# Patient Record
Sex: FEMALE | RURAL
[De-identification: names, ages, dates, MRNs, and addresses within clinical notes are randomized per-mention and may not be internally consistent; named-entity substitution may affect disease eponyms.]

---

## 2018-11-27 ENCOUNTER — TELEPHONE (OUTPATIENT)
Dept: FAMILY MEDICINE CLINIC | Facility: CLINIC | Age: 38
End: 2018-11-27

## 2018-11-27 DIAGNOSIS — M72.2 PLANTAR FASCIITIS, BILATERAL: Primary | ICD-10-CM

## 2018-12-11 NOTE — TELEPHONE ENCOUNTER
Patient was seen by Koko Pulido in [place], and referral made from their office on 10/23/18 per the note. We will be happy to schedule through [facility] if she'd like, but we were not the office that made the referral initially. See order today.

## 2019-02-20 ENCOUNTER — TELEPHONE (OUTPATIENT)
Dept: FAMILY MEDICINE CLINIC | Facility: CLINIC | Age: 39
End: 2019-02-20

## 2019-02-20 NOTE — TELEPHONE ENCOUNTER
PT called in stating she had received call regarding a referral for [facility]. PT stated she has changed her PCP and referral was no longer needed. Relayed information to Laura as well.

## 2024-07-31 ENCOUNTER — HOSPITAL ENCOUNTER (OUTPATIENT)
Dept: PHYSICAL THERAPY | Age: 44
Setting detail: THERAPIES SERIES
Discharge: HOME | End: 2024-07-31
Payer: OTHER GOVERNMENT

## 2024-07-31 PROCEDURE — 97162 PT EVAL MOD COMPLEX 30 MIN: CPT

## 2024-07-31 NOTE — PROGRESS NOTES
education and instruction: Goals, PT Role, Plan of Care, Evaluative findings, Disease Specific Education    Biofeedback        Treatment may include any combination of the following: Current Treatment Recommendations: Strengthening, ROM, Functional mobility training, Modalities, Manual, Patient/Caregiver education & training, Neuromuscular re-education, Home exercise program  Modalities: E-stim - manual     Frequency / Duration:  Patient to be seen   for   weeks      Eval Complexity:    Decision Making: Medium Complexity    PT Treatment Completed:  N/A - Evaluation Only    Therapy Time  Individual Time In: 1055       Individual Time Out: 1145  Minutes: 50  Timed Code Treatment Minutes: 50 Minutes       Therapist Signature: Mara Cole, PT    Date: 7/31/2024     I certify that the above Therapy Services are being furnished while the patient is under my care. I agree with the treatment plan and certify that this therapy is necessary.      Physician's Signature:  ___________________________   Date:_______                                                                   Ignacio Murillo PA        Physician Comments: _______________________________________________    Please sign and return to [facility].  Please fax to the location listed below. THANK YOU for this referral!    [facility]  [address] [place] [id number]  Dept: [phone]  Dept Fax: [phone]  Loc: [phone]       POC NOTE

## 2024-08-05 ENCOUNTER — HOSPITAL ENCOUNTER (OUTPATIENT)
Dept: PHYSICAL THERAPY | Age: 44
Setting detail: THERAPIES SERIES
Discharge: HOME | End: 2024-08-05
Payer: OTHER GOVERNMENT

## 2024-08-05 PROCEDURE — 97110 THERAPEUTIC EXERCISES: CPT

## 2024-08-05 PROCEDURE — 97140 MANUAL THERAPY 1/> REGIONS: CPT

## 2024-08-05 NOTE — PROGRESS NOTES
Physical Therapy: Daily Note   Patient: Jayleen Medina (43 y.o. female)   Examination Date: 2024  Plan of Care/Certification Expiration Date: 10/30/24    No data recorded   :  1980 # of Visits since SOC:   2   MRN: [id number]  CSN: [id number] Start of Care Date:   2024   Insurance: Payor: [employer] / Plan: [employer] / Product Type: *No Product type* /   Insurance ID: [id number] - (Other) Secondary Insurance (if applicable):    Referring Physician: Ignacio Murillo PA Andrea Melvin   PCP: Ignacio Murillo PA Visits to Date/Visits Approved: 2 / 15    No Show/Cancelled Appts:   /       Medical Diagnosis: Other specified urinary incontinence [N39.498] Unspecified urinary incontinence [id number]  Treatment Diagnosis: Mixed incontinence        SUBJECTIVE EXAMINATION   Pain Level: Pain Screening  Patient Currently in Pain: Denies    Patient Comments: Subjective: Nothing new since here last except for I have started my cycle.        TREATMENT     Exercises:      Treatment Reasoning    Exercise 1: Legs up wall timed x 3 mins.  Exercise 2: TA set -10 x 10 sec alone,  all others x 10  Exercise 3: Piriformis > 90  and < 90, hamstring stretch bilateral 4 x 15 sec  Exercise 4: Glute bridge with TA contraction- 1 x 10  Exercise 5: Happy baby timed x 1 min  Exercise 6: LTR-5 x 5 sec  Exercise 7: Butterfly stretch X 1 min  Exercise 8: Internal manual therapy to reduce R PFM tension including levator ani, coccygeus, OI. Ischemic pressure, sweeping. x 8 mins  Exercise 9: Biofeedback downtraining initially-NOT TODAY  Exercise 10: Biofeedback for PFM strengthening, supine hooklying alone, adduction, abduction. Move to more upright positions as able.-NOT TODAY  Exercise 11: Bladder diary issued                           Pt Education: Additional Comments: Biofeedback       ASSESSMENT     Assessment: Assessment: Initiated stretching and relaxation per primary therapists POC.  Patient did well in session.  She

## 2024-08-07 ENCOUNTER — HOSPITAL ENCOUNTER (OUTPATIENT)
Dept: PHYSICAL THERAPY | Age: 44
Setting detail: THERAPIES SERIES
Discharge: HOME | End: 2024-08-07
Payer: OTHER GOVERNMENT

## 2024-08-07 PROCEDURE — 97112 NEUROMUSCULAR REEDUCATION: CPT

## 2024-08-07 PROCEDURE — 97140 MANUAL THERAPY 1/> REGIONS: CPT

## 2024-08-07 PROCEDURE — 97110 THERAPEUTIC EXERCISES: CPT

## 2024-08-07 NOTE — PROGRESS NOTES
Physical Therapy: Daily Note   Patient: Jayleen Medina (43 y.o. female)   Examination Date: 2024  Plan of Care/Certification Expiration Date: 10/30/24    No data recorded   :  1980 # of Visits since SOC:   3   MRN: [id number]  CSN: [id number] Start of Care Date:   2024   Insurance: Payor: [employer] / Plan: [employer] / Product Type: *No Product type* /   Insurance ID: [id number] - (Other) Secondary Insurance (if applicable):    Referring Physician: Ignacio Murillo PA Andrea Melvin   PCP: Ignacio Murillo PA Visits to Date/Visits Approved: 3 / 15    No Show/Cancelled Appts:   /       Medical Diagnosis: Other specified urinary incontinence [N39.498] Unspecified urinary incontinence [id number]  Treatment Diagnosis: Mixed incontinence        SUBJECTIVE EXAMINATION   Pain Level: Pain Screening  Patient Currently in Pain: Denies    Patient Comments: Subjective: I was sore for a day after last appointment. I've been doing some of the stretches at home.    HEP Compliance: Good  Any changes to allergies, medications, or have you had any medical procedures/ER visits since your last visit?: No     OBJECTIVE EXAMINATION   Restrictions:  No data recorded No data recorded No data recorded        Improved muscle health of pelvic floor muscles with decreased tension and tenderness noted today.    TREATMENT     Exercises:      Treatment Reasoning    Exercise 1: Legs up wall timed x 3 mins.    HEP in chart  Exercise 2: TA set -10 x 10 sec alone,  all others x 10  Exercise 3: Piriformis > 90  and < 90, hamstring stretch bilateral 4 x 15 sec           increase to 30 seconds each next time  Exercise 4: Glute bridge with TA contraction- 1 x 10  Exercise 5: Happy baby timed x 1 min  Exercise 6: LTR- 10 x 5 sec  Exercise 7: Butterfly stretch X 1 min  Exercise 8: Internal manual therapy to reduce R PFM tension including levator ani, coccygeus, OI. Ischemic pressure, sweeping. x 12 mins  Exercise 9: Biofeedback

## 2024-08-12 ENCOUNTER — HOSPITAL ENCOUNTER (OUTPATIENT)
Dept: PHYSICAL THERAPY | Age: 44
Setting detail: THERAPIES SERIES
Discharge: HOME | End: 2024-08-12
Payer: OTHER GOVERNMENT

## 2024-08-12 PROCEDURE — 97110 THERAPEUTIC EXERCISES: CPT

## 2024-08-12 PROCEDURE — 97140 MANUAL THERAPY 1/> REGIONS: CPT

## 2024-08-12 ASSESSMENT — PAIN DESCRIPTION - ORIENTATION: ORIENTATION: LOWER

## 2024-08-12 ASSESSMENT — PAIN SCALES - GENERAL: PAINLEVEL_OUTOF10: 1

## 2024-08-12 ASSESSMENT — PAIN DESCRIPTION - DESCRIPTORS: DESCRIPTORS: SORE

## 2024-08-12 ASSESSMENT — PAIN DESCRIPTION - LOCATION: LOCATION: BACK

## 2024-08-12 NOTE — PROGRESS NOTES
Physical Therapy: Daily Note   Patient: Jayleen Medina (43 y.o. female)   Examination Date: 2024  Plan of Care/Certification Expiration Date: 10/30/24    No data recorded   :  1980 # of Visits since SOC:   4   MRN: [id number]  CSN: [id number] Start of Care Date:   2024   Insurance: Payor: [employer] / Plan: [employer] / Product Type: *No Product type* /   Insurance ID: [id number] - (Other) Secondary Insurance (if applicable):    Referring Physician: Ignacio Murillo PA Andrea Melvin   PCP: Ignacio Murillo PA Visits to Date/Visits Approved: 4 / 15    No Show/Cancelled Appts:   /       Medical Diagnosis: Other specified urinary incontinence [N39.498] Unspecified urinary incontinence [id number]  Treatment Diagnosis: Mixed incontinence        SUBJECTIVE EXAMINATION   Pain Level: Pain Screening  Patient Currently in Pain: Yes  Pain Assessment: 0-10  Pain Level: 1  Pain Location: Back  Pain Orientation: Lower  Pain Descriptors: Sore    Patient Comments: Subjective: My back is feeling better.      TREATMENT     Exercises:      Treatment Reasoning    Exercise 1: Legs up wall timed x 3 mins.    HEP issued 2024  Exercise 2: TA set -10 x 10 sec alone,  all others x 10  Exercise 3: Piriformis > 90  and < 90, hamstring stretch bilateral 4 x 15 sec           increase to 30 seconds each next time  Exercise 4: Glute bridge with TA contraction- 1 x 10  Exercise 5: Happy baby timed x 1 min  Exercise 6: LTR- 10 x 5 sec  Exercise 7: Butterfly stretch X 1 min  Exercise 8: Internal manual therapy to reduce R PFM tension including levator ani, coccygeus, OI. Ischemic pressure, sweeping. x 12 mins  Exercise 9: Biofeedback downtraining initially-NOT TODAY  Exercise 10: Biofeedback for PFM strengthening, supine hooklying alone, adduction, abduction. Move to more upright positions as able.-NOT TODAY  Exercise 11: Bladder diary issued                           Pt Education: Additional Comments: Biofeedback

## 2024-08-15 ENCOUNTER — APPOINTMENT (OUTPATIENT)
Dept: PHYSICAL THERAPY | Age: 44
End: 2024-08-15
Payer: OTHER GOVERNMENT

## 2024-08-19 ENCOUNTER — HOSPITAL ENCOUNTER (OUTPATIENT)
Dept: PHYSICAL THERAPY | Age: 44
Setting detail: THERAPIES SERIES
Discharge: HOME | End: 2024-08-19
Payer: OTHER GOVERNMENT

## 2024-08-19 PROCEDURE — 97124 MASSAGE THERAPY: CPT

## 2024-08-19 PROCEDURE — 97110 THERAPEUTIC EXERCISES: CPT

## 2024-08-19 ASSESSMENT — PAIN DESCRIPTION - DESCRIPTORS: DESCRIPTORS: SORE

## 2024-08-19 ASSESSMENT — PAIN DESCRIPTION - ORIENTATION: ORIENTATION: LOWER;RIGHT

## 2024-08-19 ASSESSMENT — PAIN SCALES - GENERAL: PAINLEVEL_OUTOF10: 2

## 2024-08-19 ASSESSMENT — PAIN DESCRIPTION - LOCATION: LOCATION: BACK;GROIN;BUTTOCKS

## 2024-08-19 NOTE — PROGRESS NOTES
TODAY  Exercise 10: Biofeedback for PFM strengthening, supine hooklying alone, adduction, abduction. Move to more upright positions as able.-NOT TODAY  Exercise 11: Bladder diary issued 7/31                          Pt Education: Additional Comments: Biofeedback       ASSESSMENT     Assessment: Assessment: Patient presents today with complaints of having had some back and groin issues on the right.  She relates it was very intense.  She reports feeling some better today but is concerned about the manual internal therapy.  With internal manual therapy, patient had increased sensitivity and pain in right OI, levator ani and coccyxgeus.She was able to complete all exercises with no increase in pain.  Body Structures, Functions, Activity Limitations Requiring Skilled Therapeutic Intervention: Decreased ADL status, Decreased body mechanics, Decreased strength, Decreased high-level IADLs, Increased pain    Post-Treatment Pain Level:      No data recorded  Therapy Prognosis: Good       GOALS   Patient Goals : To decrease leakage  Short Term Goals Completed by 4 weeks Current Status Goal Status   Patient will be independent with HEP   New   Patient will report no to min tenderness with palpation of R PFMs on internal exam to demo improved biomechanics and overall muscle health   New   Patient will improve PERF score to 2/10/10/10   New                                                               Long Term Goals Completed by 6-8 weeks Current Status Goal Status   Patient will improve score on overall PFIQ7 to 10% impairment or less to demo improved QOL   New   Patient will report no tenderness to internal palpation of bilateral PFMs to demo improve muscle healthy and mechanics   New   Patient will report 75% improvement in urinary leakage symptoms   New   Patient will report ability to return to all ADLs with minimal to no concern over urinary leakage   New   Patient will improve PERF score to 3/10/15/20

## 2024-08-21 ENCOUNTER — HOSPITAL ENCOUNTER (OUTPATIENT)
Dept: PHYSICAL THERAPY | Age: 44
Setting detail: THERAPIES SERIES
Discharge: HOME | End: 2024-08-21
Payer: OTHER GOVERNMENT

## 2024-08-21 PROCEDURE — 97140 MANUAL THERAPY 1/> REGIONS: CPT

## 2024-08-21 PROCEDURE — 97110 THERAPEUTIC EXERCISES: CPT

## 2024-08-21 ASSESSMENT — PAIN DESCRIPTION - LOCATION: LOCATION: BACK;GROIN;BUTTOCKS

## 2024-08-21 ASSESSMENT — PAIN DESCRIPTION - ORIENTATION: ORIENTATION: RIGHT;LOWER

## 2024-08-21 ASSESSMENT — PAIN SCALES - GENERAL: PAINLEVEL_OUTOF10: 4

## 2024-08-21 ASSESSMENT — PAIN DESCRIPTION - DESCRIPTORS: DESCRIPTORS: TENDER;SORE;OTHER (COMMENT)

## 2024-08-21 NOTE — PROGRESS NOTES
tension including levator ani, coccygeus, OI. Ischemic pressure, sweeping. x 15 mins    Lumbar PA mobs grade 1 L3-L5 x 8 minutes  Exercise 9: Biofeedback downtraining initially-NOT TODAY  Exercise 10: Biofeedback for PFM strengthening, supine hooklying alone, adduction, abduction. Move to more upright positions as able.-NOT TODAY  Exercise 11: Bladder diary issued 7/31                          Pt Education: Additional Comments: Biofeedback       ASSESSMENT     Assessment: Assessment: Patient was in increased pain today. Lumbar mobility decreased R compared to L. Patient able to move through exercises well today with minimal increased pain noted. Internal pelvic floor work with significant tension and tenderness noted in coccygeus today. Pain rated at 4-5 with pressure. At least 2 of the areas responded well to ischemic pressure. Patient reported pain was tolerable when leaving clinic.  Body Structures, Functions, Activity Limitations Requiring Skilled Therapeutic Intervention: Decreased ADL status, Decreased body mechanics, Decreased strength, Decreased high-level IADLs, Increased pain    Post-Treatment Pain Level:  4/10    No data recorded  Therapy Prognosis: Good       GOALS   Patient Goals : To decrease leakage  Short Term Goals Completed by 4 weeks Current Status Goal Status   Patient will be independent with HEP   New   Patient will report no to min tenderness with palpation of R PFMs on internal exam to demo improved biomechanics and overall muscle health   New   Patient will improve PERF score to 2/10/10/10   New                                                               Long Term Goals Completed by 6-8 weeks Current Status Goal Status   Patient will improve score on overall PFIQ7 to 10% impairment or less to demo improved QOL   New   Patient will report no tenderness to internal palpation of bilateral PFMs to demo improve muscle healthy and mechanics   New   Patient will report 75% improvement in urinary

## 2024-08-26 ENCOUNTER — APPOINTMENT (OUTPATIENT)
Dept: PHYSICAL THERAPY | Age: 44
End: 2024-08-26
Payer: OTHER GOVERNMENT

## 2024-08-28 ENCOUNTER — APPOINTMENT (OUTPATIENT)
Dept: PHYSICAL THERAPY | Age: 44
End: 2024-08-28
Payer: OTHER GOVERNMENT

## 2024-09-18 ENCOUNTER — HOSPITAL ENCOUNTER (OUTPATIENT)
Dept: PHYSICAL THERAPY | Age: 44
Setting detail: THERAPIES SERIES
Discharge: HOME | End: 2024-09-18
Payer: OTHER GOVERNMENT

## 2024-09-18 PROCEDURE — 97110 THERAPEUTIC EXERCISES: CPT

## 2024-09-18 PROCEDURE — 97140 MANUAL THERAPY 1/> REGIONS: CPT

## 2024-09-24 ENCOUNTER — HOSPITAL ENCOUNTER (OUTPATIENT)
Dept: PHYSICAL THERAPY | Age: 44
Setting detail: THERAPIES SERIES
Discharge: HOME | End: 2024-09-24
Payer: OTHER GOVERNMENT

## 2024-09-24 PROCEDURE — 97110 THERAPEUTIC EXERCISES: CPT

## 2024-09-24 ASSESSMENT — PAIN DESCRIPTION - LOCATION: LOCATION: BACK;GROIN

## 2024-09-24 ASSESSMENT — PAIN DESCRIPTION - ORIENTATION: ORIENTATION: RIGHT;LOWER

## 2024-09-24 ASSESSMENT — PAIN SCALES - GENERAL: PAINLEVEL_OUTOF10: 1

## 2024-10-01 ENCOUNTER — APPOINTMENT (OUTPATIENT)
Dept: PHYSICAL THERAPY | Age: 44
End: 2024-10-01
Payer: OTHER GOVERNMENT

## 2024-10-10 ENCOUNTER — HOSPITAL ENCOUNTER (OUTPATIENT)
Dept: PHYSICAL THERAPY | Age: 44
Setting detail: THERAPIES SERIES
Discharge: HOME | End: 2024-10-10
Payer: OTHER GOVERNMENT

## 2024-10-10 PROCEDURE — 90912 BFB TRAINING 1ST 15 MIN: CPT

## 2024-10-10 PROCEDURE — 97110 THERAPEUTIC EXERCISES: CPT

## 2024-10-10 NOTE — PROGRESS NOTES
internal palpation of bilateral PFMs to demo improve muscle healthy and mechanics 9/18 tenderness decreased significantly compared with initial evaluation In progress   Patient will report 75% improvement in urinary leakage symptoms 9/18 patient reports improvement in leakage symptoms, not yet 75% In progress   Patient will report ability to return to all ADLs with minimal to no concern over urinary leakage 9/18 patient reports smaller tasks do not cause leakage anymore, higher level activities still cause leakage In progress, Partially met   Patient will improve PERF score to 3/10/15/20 9/18 2/8/10/10 In progress                                                TREATMENT PLAN   Plan Frequency: 1-2x/week  Plan weeks: 4-8 weeks  Current Treatment Recommendations: Strengthening, ROM, Functional mobility training, Modalities, Manual, Patient/Caregiver education & training, Neuromuscular re-education, Home exercise program  Modalities: E-stim - manual   Additional Comments: Biofeedback       Therapy Time  Individual Time In: 0905       Individual Time Out: 0955  Minutes: 50  Timed Code Treatment Minutes: 50 Minutes     Electronically signed by Rhonda Webb PTA  on 10/10/2024 at 11:34 AM   POC NOTE  Electronically signed by Rhonda Webb PTA on 10/10/2024 at 11:35 AM

## 2024-10-16 ENCOUNTER — HOSPITAL ENCOUNTER (OUTPATIENT)
Dept: PHYSICAL THERAPY | Age: 44
Setting detail: THERAPIES SERIES
Discharge: HOME | End: 2024-10-16
Payer: OTHER GOVERNMENT

## 2024-10-16 PROCEDURE — 90913 BFB TRAINING EA ADDL 15 MIN: CPT

## 2024-10-16 PROCEDURE — 97110 THERAPEUTIC EXERCISES: CPT

## 2024-10-16 PROCEDURE — 90912 BFB TRAINING 1ST 15 MIN: CPT

## 2024-10-16 NOTE — PROGRESS NOTES
Physical Therapy: Daily Note   Patient: Jayleen Medina (43 y.o. female)   Examination Date: 10/16/2024  Plan of Care/Certification Expiration Date: 24    No data recorded   :  1980 # of Visits since SOC:   10   MRN: [id number]  CSN: [id number] Start of Care Date:   2024   Insurance: Payor: [employer] / Plan: [employer]UM / Product Type: *No Product type* /   Insurance ID: [id number] - (Other) Secondary Insurance (if applicable):    Referring Physician: Ignacio Murillo PA Andrea Melvin   PCP: Ignacio Murillo PA Visits to Date/Visits Approved: 10 / 15    No Show/Cancelled Appts:   /       Medical Diagnosis: Other specified urinary incontinence [N39.498] Unspecified urinary incontinence [id number]  Treatment Diagnosis: Mixed incontinence        SUBJECTIVE EXAMINATION   Pain Level: Pain Screening  Patient Currently in Pain: Denies    Patient Comments: Subjective: I can tell that the times when I feel like I have to go immediately has gotten better. Things like sneezing and coughing are better most of the time too. Still have leakage with things like horseback riding.    HEP Compliance: Good  Any changes to allergies, medications, or have you had any medical procedures/ER visits since your last visit?: No     OBJECTIVE EXAMINATION   Restrictions:  No data recorded No data recorded No data recorded      PFIQ7 14% impairment at this time    Improved TA contraction and decreased use of accessory muscles with pfm contractions.         TREATMENT     Exercises:  Therapeutic exercise (CPT 78417)   Treatment Reasoning    Exercise 1: Legs up wall timed x 3 mins.  wall stretch progression      progressive relaxation targeting pfm x 6 minutes   HEP issued 2024    reassessment 10/16  Exercise 2: TA set -10 x 10 sec alone,  all others x 10  Exercise 3: Piriformis > 90  and < 90 stretch bilateral  2 x 15 sec each               QL stretch 2 x 15\"  not today  Exercise 4: Glute bridge with TA contraction- 1 x

## 2024-10-23 ENCOUNTER — HOSPITAL ENCOUNTER (OUTPATIENT)
Dept: PHYSICAL THERAPY | Age: 44
Setting detail: THERAPIES SERIES
Discharge: HOME | End: 2024-10-23
Payer: OTHER GOVERNMENT

## 2024-10-23 PROCEDURE — 97110 THERAPEUTIC EXERCISES: CPT

## 2024-10-23 PROCEDURE — 90913 BFB TRAINING EA ADDL 15 MIN: CPT

## 2024-10-23 PROCEDURE — 90912 BFB TRAINING 1ST 15 MIN: CPT

## 2024-10-23 NOTE — PROGRESS NOTES
forward stretch on stool x 1 min  Exercise 8: Internal manual therapy to reduce R PFM tension including levator ani, coccygeus, OI. Ischemic pressure, sweeping.  not today  Exercise 9: Biofeedback downtraining initially x 3 min  Exercise 10: Biofeedback for PFM strengthening, inclined hooklying alone 5 x 10 sec with legs bent up and 5 x 10 sec with legs ext, adduction with yoga block 5 x 10 sec, abduction 0 x 10. sec, seated alone 10 sec x 5, with yoga block for hip add 10 sec x 5, with red t-band for hip abd 10 sec x 5   moved to inclined today with patient accepting it well, may be ready for seated next  Exercise 11: Bladder diary issued 7/31    Limitations addressed: Mobility, Strength, Flexibility, Activity tolerance  Therapist provided: Verbal cuing  Progressed: Complexity of movement                     Pt Education: Additional Comments: Biofeedback       ASSESSMENT     Assessment: Assessment: Patient did well with tx today with increased strength and activity tolerance noted by performing more upright positions with pfm contraction on biofeedback. She achieved level 8-15 thorughout session ,but did have more fatigue noted toward end of session.  Body Structures, Functions, Activity Limitations Requiring Skilled Therapeutic Intervention: Decreased ADL status, Decreased body mechanics, Decreased strength, Decreased high-level IADLs, Increased pain      No data recorded  Therapy Prognosis: Good       GOALS   Patient Goals : To decrease leakage  Short Term Goals Completed by 4 weeks Current Status Goal Status   Patient will be independent with HEP 9/18 Patient reports regularly performing HEP at home. Met   Patient will report no to min tenderness with palpation of R PFMs on internal exam to demo improved biomechanics and overall muscle health 9/18 Min tenderness of R pfms today, with only slight 'pinch' reported Met   Patient will improve PERF score to 2/10/10/10 9/18 PERF 2/8/10/10 today Partially met

## 2024-10-30 ENCOUNTER — HOSPITAL ENCOUNTER (OUTPATIENT)
Dept: PHYSICAL THERAPY | Age: 44
Setting detail: THERAPIES SERIES
Discharge: HOME | End: 2024-10-30
Payer: OTHER GOVERNMENT

## 2024-10-30 PROCEDURE — 90913 BFB TRAINING EA ADDL 15 MIN: CPT

## 2024-10-30 PROCEDURE — 90912 BFB TRAINING 1ST 15 MIN: CPT

## 2024-10-30 PROCEDURE — 97110 THERAPEUTIC EXERCISES: CPT

## 2024-10-30 NOTE — PROGRESS NOTES
Physical Therapy: Daily Note   Patient: Jayleen Medina (43 y.o. female)   Examination Date: 10/30/2024  Plan of Care/Certification Expiration Date: 24    No data recorded   :  1980 # of Visits since SOC:   12   MRN: [id number]  CSN: [id number] Start of Care Date:   2024   Insurance: Payor: [employer] / Plan: [employer]UM / Product Type: *No Product type* /   Insurance ID: [id number] - (Other) Secondary Insurance (if applicable):    Referring Physician: Ignacio Murillo PA Andrea Melvin   PCP: Ignacio Murillo PA Visits to Date/Visits Approved: 12 / 15    No Show/Cancelled Appts:   /       Medical Diagnosis: Other specified urinary incontinence [N39.498] Unspecified urinary incontinence [id number]  Treatment Diagnosis: Mixed incontinence        SUBJECTIVE EXAMINATION     Patient Comments: Subjective: Patient states she did well after last session, but says she is sore all over right now from working on redoign her ceiling.    Any changes to allergies, medications, or have you had any medical procedures/ER visits since your last visit?: No     OBJECTIVE EXAMINATION   Restrictions:  No data recorded No data recorded No data recorded        TREATMENT     Exercises:  Therapeutic exercise (CPT 03556)   Treatment Reasoning    Exercise 1: Legs up wall timed x 3 mins.  wall stretch progression      progressive relaxation targeting pfm x 6 minutes - not today only regular legs up the wall today  HEP issued 2024  Exercise 2: TA set -10 x 10 sec alone,  all others x 10  Exercise 3: Piriformis > 90  and < 90 stretch bilateral  2 x 15 sec each               QL stretch 2 x 15\"  Exercise 4: Glute bridge with TA contraction- 1 x 15  Exercise 5: Happy baby timed x 2 min   with LTR last 30 seconds                     relaxation x 3 minutes between sets of pfm contractions- not today only happy baby performed  Exercise 6: LTR- 10 x 5 sec  Exercise 7: seated forward bend to toes to sit up with band resistance x

## 2024-11-07 ENCOUNTER — HOSPITAL ENCOUNTER (OUTPATIENT)
Dept: PHYSICAL THERAPY | Age: 44
Setting detail: THERAPIES SERIES
Discharge: HOME | End: 2024-11-07
Payer: OTHER GOVERNMENT

## 2024-11-07 PROCEDURE — 97110 THERAPEUTIC EXERCISES: CPT

## 2024-11-07 NOTE — PROGRESS NOTES
Physical Therapy  Daily Treatment Note  Date: 2024  Patient Name: Jayleen Medina  MRN: [id number]     :   1980    Subjective:      PT Visit Information  Onset Date: 24  PT Insurance Information: [place] -15 visits approved through 24  Total # of Visits Approved: 15  Total # of Visits to Date: 13  Plan of Care/Certification Expiration Date: 24  Progress Note Due Date: 11/15/24  Referring Provider (secondary): Ignacio Murillo  Subjective: Patient reports no pain this morning but is really sore still from working on her house.    Pain Screening  Patient Currently in Pain: Denies       Treatment Activities:   Exercises  Exercise 1: Legs up wall timed x 3 mins.  wall stretch progression      progressive relaxation targeting pfm x 6 minutes - not today only regular legs up the wall today  HEP issued 2024  Exercise 2: TA set -10 x 10 sec alone,  all others x 10  Exercise 3: Piriformis > 90  and < 90 stretch bilateral  3 x 15 sec each               QL stretch 3 x 15\"  Exercise 4: Glute bridge with TA contraction- 1 x 15  Exercise 5: Happy baby timed x 3 min   with LTR last 30 seconds                     relaxation x 3 minutes between sets of pfm contractions- not today only happy baby performed  Exercise 6: LTR- 10 x 5 sec  Exercise 7: seated forward bend to toes to sit up with band resistance x 10 red band                 seated forward stretch on stool x 1 min  Exercise 8: Internal manual therapy to reduce R PFM tension including levator ani, coccygeus, OI. Ischemic pressure, sweeping.  not today  Exercise 9: Biofeedback downtraining initially x 2 min, level 1-2 initially today  Exercise 10: Biofeedback for PFM strengthening, inclined hooklying alone 5 x 10 sec with legs ext, adduction with yoga block 5 x 10 sec, abduction 5 x 10. sec, seated alone 10 sec x 5, with yoga block for hip add 10 sec x 5, with red t-band for hip abd 10 sec x 5, standing alonen 10 sec x 5  Exercise 11: Bladder

## 2024-11-11 ENCOUNTER — HOSPITAL ENCOUNTER (OUTPATIENT)
Dept: PHYSICAL THERAPY | Age: 44
Setting detail: THERAPIES SERIES
Discharge: HOME | End: 2024-11-11
Payer: OTHER GOVERNMENT

## 2024-11-11 PROCEDURE — 97110 THERAPEUTIC EXERCISES: CPT

## 2024-11-11 NOTE — PROGRESS NOTES
[facility]  OUTPATIENT PHYSICAL THERAPY  DISCHARGE SUMMARY    Date: 2024  Patient Name: Jayleen Medina        MRN: [id number]    ACCOUNT #: [id number]  : 1980  (43 y.o.)  Gender: female      Diagnosis: Unspecified urinary incontinence [id number]     Treatment Diagnosis: Mixed incontinence    Total # of Visits Approved: 15  Total # of Visits to Date: 14    Subjective   Subjective: Patient states she is doing well and says that she feels ready to make today her last day.    Additional Pertinent Hx: History of 2 vaginal delivery, one with episiotomy, 6 year history of incontinence      Objective  Treatments received include:ther-ex, manual, biofeedback   See objective/subjective data in goals    Assessment  Assessment: Patient did well with tx today with increased pfm strength and activity tolerance, decreased pfm tension and tenderness to palpation, decreased urinary incontinence and improved qol reported with questionaire. She has met 7/8 goals at this time with 1/8 goal progress made towards, see goals for details.  She is independent with HEP and agreeable with making today her last visit and continuing with HEP.           GOALS   Patient Goals : To decrease leakage  Short Term Goals Completed by 4 weeks Current Status Goal Status   Patient will be independent with HEP 2024: Patient reports regularly performing HEP at home. Met   Patient will report no to min tenderness with palpation of R PFMs on internal exam to demo improved biomechanics and overall muscle health 2024: No tenderness or tension ntoed to palpation throughout bilateral pfm's, but did report different sensation on left obturator internus, but said no pain or tenderness Met   Patient will improve PERF score to 2/10/10/10 2024: PERF score 3+/10/15/20 Met                                                               Long Term Goals Completed by 6-8 weeks Current Status Goal Status   Patient will improve score 
impairment on PFIQ total score Met   Patient will report no tenderness to internal palpation of bilateral PFMs to demo improve muscle healthy and mechanics 11/11/2024: No tenderness or tension ntoed to palpation throughout bilateral pfm's, but did report different sensation on left obturator internus, but said no pain or tenderness Met   Patient will report 75% improvement in urinary leakage symptoms 11/11/2024: Patient states she has much less urine leakage with minor activities but still issues with the more intense physical activity, with 50% improvement reported In progress   Patient will report ability to return to all ADLs with minimal to no concern over urinary leakage 11/11/2024: Patient reports decreased concern over urine leakage with lower level activities and still some issues with more intense activities, but overall says minimal concern Met   Patient will improve PERF score to 3/10/15/20 11/11/2024: PERF score 3+/10/15/20 Met                                                TREATMENT PLAN   D/C with independent HEP        Therapy Time  Individual Time In: 1050       Individual Time Out:  1134  Minutes:  44        Electronically signed by Verónica Dupree, PT  on 11/11/2024 at 11:50 AM   POC NOTE

## 2024-11-14 ENCOUNTER — APPOINTMENT (OUTPATIENT)
Dept: PHYSICAL THERAPY | Age: 44
End: 2024-11-14
Payer: OTHER GOVERNMENT

## 2024-11-20 ENCOUNTER — APPOINTMENT (OUTPATIENT)
Dept: PHYSICAL THERAPY | Age: 44
End: 2024-11-20
Payer: OTHER GOVERNMENT

## 2025-02-20 ENCOUNTER — OFFICE VISIT (OUTPATIENT)
Dept: GASTROENTEROLOGY | Age: 45
End: 2025-02-20
Payer: OTHER GOVERNMENT

## 2025-02-20 VITALS
SYSTOLIC BLOOD PRESSURE: 139 MMHG | WEIGHT: 200 LBS | HEIGHT: 69 IN | BODY MASS INDEX: 29.62 KG/M2 | OXYGEN SATURATION: 98 % | DIASTOLIC BLOOD PRESSURE: 83 MMHG | HEART RATE: 100 BPM

## 2025-02-20 DIAGNOSIS — Z86.59 HISTORY OF BULIMIA: ICD-10-CM

## 2025-02-20 DIAGNOSIS — K21.9 GASTROESOPHAGEAL REFLUX DISEASE, UNSPECIFIED WHETHER ESOPHAGITIS PRESENT: ICD-10-CM

## 2025-02-20 DIAGNOSIS — K76.0 FATTY LIVER: Primary | ICD-10-CM

## 2025-02-20 PROCEDURE — 99204 OFFICE O/P NEW MOD 45 MIN: CPT | Performed by: NURSE PRACTITIONER

## 2025-02-20 RX ORDER — VITAMIN B COMPLEX
1 CAPSULE ORAL DAILY
COMMUNITY

## 2025-02-20 RX ORDER — BUSPIRONE HYDROCHLORIDE 10 MG/1
10 TABLET ORAL PRN
COMMUNITY

## 2025-02-20 RX ORDER — FLUOXETINE HYDROCHLORIDE 40 MG/1
40 CAPSULE ORAL DAILY
COMMUNITY

## 2025-02-20 RX ORDER — PANTOPRAZOLE SODIUM 20 MG/1
20 TABLET, DELAYED RELEASE ORAL DAILY
COMMUNITY

## 2025-02-20 RX ORDER — POTASSIUM CHLORIDE 1.5 G/1.58G
20 POWDER, FOR SOLUTION ORAL 2 TIMES DAILY
COMMUNITY

## 2025-02-20 RX ORDER — LISDEXAMFETAMINE DIMESYLATE 40 MG/1
1 CAPSULE ORAL EVERY MORNING
COMMUNITY

## 2025-02-20 RX ORDER — IBUPROFEN 200 MG
200 TABLET ORAL EVERY 6 HOURS PRN
COMMUNITY

## 2025-02-20 RX ORDER — MULTIVIT-MIN/IRON/FOLIC ACID/K 18-600-40
2000 CAPSULE ORAL 2 TIMES DAILY
COMMUNITY

## 2025-02-20 RX ORDER — UBIDECARENONE 75 MG
200 CAPSULE ORAL DAILY
COMMUNITY

## 2025-02-20 RX ORDER — MAGNESIUM 200 MG
200 TABLET ORAL 2 TIMES DAILY
COMMUNITY

## 2025-02-20 ASSESSMENT — ENCOUNTER SYMPTOMS
ABDOMINAL PAIN: 0
VOMITING: 0
RECTAL PAIN: 0
TROUBLE SWALLOWING: 0
ABDOMINAL DISTENTION: 0
ANAL BLEEDING: 0
BLOOD IN STOOL: 0
DIARRHEA: 0
CONSTIPATION: 0
COUGH: 0
NAUSEA: 0
SHORTNESS OF BREATH: 0
CHOKING: 0

## 2025-02-20 NOTE — PROGRESS NOTES
vasculature    US ORGAN ELASTOGRAPHY     Standing Status:   Future     Standing Expiration Date:   2/20/2026     Medications  No orders of the defined types were placed in this encounter.        Patient History:     Past Medical History:   Diagnosis Date    Bulimia     UNDER CONTROL PER PT       Past Surgical History:   Procedure Laterality Date    UPPER GASTROINTESTINAL ENDOSCOPY  2023    NORMAL PER PT       Family History   Problem Relation Age of Onset    Breast Cancer Maternal Grandmother 49    Colon Cancer Neg Hx     Colon Polyps Neg Hx        Social History     Socioeconomic History    Marital status:    Tobacco Use    Smoking status: Every Day     Current packs/day: 0.50     Average packs/day: 0.5 packs/day for 6.1 years (3.1 ttl pk-yrs)     Types: Cigarettes     Start date: 1/1/2019    Smokeless tobacco: Never   Vaping Use    Vaping status: Never Used   Substance and Sexual Activity    Alcohol use: Yes     Comment: 3X WKLY     Social Determinants of Health      [facility]    Family and Community Support    Received from [facility]    Abuse Screen    Received from [facility]    Housing Stability       Current Outpatient Medications   Medication Sig Dispense Refill    Lisdexamfetamine Dimesylate 40 MG CAPS Take 1 capsule by mouth every morning.      FLUoxetine (PROZAC) 40 MG capsule Take 1 capsule by mouth daily      busPIRone (BUSPAR) 10 MG tablet Take 1 tablet by mouth as needed      vitamin D 50 MCG (2000 UT) CAPS capsule Take 1 capsule by mouth in the morning and at bedtime      pantoprazole (PROTONIX) 20 MG tablet Take 1 tablet by mouth daily      b complex vitamins capsule Take 1 capsule by mouth daily      magnesium 200 MG TABS tablet Take 1 tablet by mouth 2 times daily      potassium chloride (KLOR-CON) 20 MEQ packet Take 20 mEq by mouth 2 times daily

## 2025-03-06 ENCOUNTER — HOSPITAL ENCOUNTER (OUTPATIENT)
Dept: ULTRASOUND IMAGING | Age: 45
Discharge: HOME | End: 2025-03-06
Payer: OTHER GOVERNMENT

## 2025-03-06 DIAGNOSIS — K76.0 FATTY LIVER: ICD-10-CM

## 2025-03-06 PROCEDURE — 76705 ECHO EXAM OF ABDOMEN: CPT

## 2025-03-06 PROCEDURE — 76981 USE PARENCHYMA: CPT

## 2025-03-11 ENCOUNTER — RESULTS FOLLOW-UP (OUTPATIENT)
Dept: ULTRASOUND IMAGING | Age: 45
End: 2025-03-11

## 2025-04-08 ENCOUNTER — RESULTS FOLLOW-UP (OUTPATIENT)
Dept: GASTROENTEROLOGY | Age: 45
End: 2025-04-08

## 2025-04-08 NOTE — RESULT ENCOUNTER NOTE
Fatty liver noted, normal surface contour of the liver no liver mass noted   Otherwise normal liver ultrasound

## 2025-06-04 ENCOUNTER — OFFICE VISIT (OUTPATIENT)
Age: 45
End: 2025-06-04
Payer: OTHER GOVERNMENT

## 2025-06-04 VITALS
BODY MASS INDEX: 29.66 KG/M2 | DIASTOLIC BLOOD PRESSURE: 92 MMHG | HEIGHT: 68 IN | SYSTOLIC BLOOD PRESSURE: 148 MMHG | WEIGHT: 195.7 LBS

## 2025-06-04 DIAGNOSIS — N92.0 MENORRHAGIA WITH REGULAR CYCLE: ICD-10-CM

## 2025-06-04 DIAGNOSIS — N93.9 ABNORMAL UTERINE BLEEDING (AUB): Primary | ICD-10-CM

## 2025-06-04 DIAGNOSIS — F17.200 SMOKER: ICD-10-CM

## 2025-06-04 DIAGNOSIS — N94.6 DYSMENORRHEA: ICD-10-CM

## 2025-06-04 DIAGNOSIS — R93.89 ENDOMETRIAL THICKENING ON ULTRASOUND: ICD-10-CM

## 2025-06-04 RX ORDER — POTASSIUM CHLORIDE 1.5 G/1.58G
20 POWDER, FOR SOLUTION ORAL
COMMUNITY

## 2025-06-04 RX ORDER — FLUOXETINE HYDROCHLORIDE 40 MG/1
40 CAPSULE ORAL DAILY
COMMUNITY
Start: 2019-05-31

## 2025-06-04 RX ORDER — SODIUM CHLORIDE 0.9 % (FLUSH) 0.9 %
10 SYRINGE (ML) INJECTION EVERY 12 HOURS SCHEDULED
OUTPATIENT
Start: 2025-06-04

## 2025-06-04 RX ORDER — BUSPIRONE HYDROCHLORIDE 10 MG/1
10 TABLET ORAL AS NEEDED
COMMUNITY
Start: 2019-05-31

## 2025-06-04 RX ORDER — LISDEXAMFETAMINE DIMESYLATE 40 MG/1
40 CAPSULE ORAL EVERY MORNING
COMMUNITY
Start: 2023-05-31

## 2025-06-04 RX ORDER — PANTOPRAZOLE SODIUM 20 MG/1
20 TABLET, DELAYED RELEASE ORAL 2 TIMES DAILY
COMMUNITY
Start: 2011-05-31

## 2025-06-04 RX ORDER — B-COMPLEX WITH VITAMIN C
TABLET ORAL
COMMUNITY
Start: 2022-05-31

## 2025-06-04 RX ORDER — SODIUM CHLORIDE 0.9 % (FLUSH) 0.9 %
1-10 SYRINGE (ML) INJECTION AS NEEDED
OUTPATIENT
Start: 2025-06-04

## 2025-06-04 RX ORDER — SODIUM CHLORIDE 9 MG/ML
40 INJECTION, SOLUTION INTRAVENOUS AS NEEDED
OUTPATIENT
Start: 2025-06-04

## 2025-06-04 RX ORDER — IBUPROFEN 200 MG
200 TABLET ORAL EVERY 6 HOURS PRN
COMMUNITY

## 2025-06-04 RX ORDER — MAGNESIUM 200 MG
1 TABLET ORAL 2 TIMES DAILY
COMMUNITY

## 2025-06-04 NOTE — H&P (VIEW-ONLY)
Sadiq Padgett MD  [facility]  [address]  [place] [address]  Office [phone]  Fax [phone]      [facility]  Ne Ma  1980  [id number]  [id number]  2025    Alexander Ma is a 44 y.o. year old female  who presents for surgery due to DUB and Thickened endometrial lining.  Failed conservative measures include N/A.    COEMIG Procedure no  Rating of Pain 3  Effect on daily activities significant    HPI    Risks, benefits, and alternatives of a D&C with hysteroscopy were discussed with the patient in detail. Intraoperative risks of bleeding and damage to surrounding organs, including but not limited to intestine, bladder and ureter, were explained. Management of these were also explained. Postoperative complications such as infection, pneumonia, DVT, and bleeding were explained. The importance of compliance with postoperative restrictions was discussed. The diagnostic nature of the procedure was explained.    Uterine perforation was discussed with the patient at length.     All of the patient's questions were answered to her satisfaction. She was encouraged to return for an additional appointment if she had further questions. She verbalized understanding of the above and wished to proceed with the outlined plan.      Past Medical History:   Diagnosis Date    Anxiety and depression     Chronic GERD     Eating disorder     Fatty liver        Past Surgical History:   Procedure Laterality Date    ENDOSCOPY      LYMPH NODE DISSECTION Left 2006    Armpit    RHINOPLASTY      age 15         Current Outpatient Medications:     busPIRone (BUSPAR) 10 MG tablet, Take 1 tablet by mouth As Needed., Disp: , Rfl:     Cholecalciferol 50 MCG ( UT) capsule, Take 1 capsule by mouth., Disp: , Rfl:     Coenzyme Q10 200 MG capsule, Take  by mouth Daily., Disp: , Rfl:     cyanocobalamin (VITAMIN B-12) 500 MCG tablet, Take 1 tablet by mouth Daily.,  Disp: , Rfl:     FLUoxetine (PROzac) 40 MG capsule, Take 1 capsule by mouth Daily., Disp: , Rfl:     ibuprofen (ADVIL,MOTRIN) 200 MG tablet, Take 1 tablet by mouth Every 6 (Six) Hours As Needed. for pain, Disp: , Rfl:     lisdexamfetamine (VYVANSE) 40 MG capsule, Take 1 capsule by mouth Every Morning, Disp: , Rfl:     Magnesium 200 MG tablet, Take 1 tablet by mouth 2 (Two) Times a Day., Disp: , Rfl:     Midazolam, Anticonvulsant, (NAYZILAM NA), As Needed., Disp: , Rfl:     pantoprazole (PROTONIX) 20 MG EC tablet, Take 1 tablet by mouth 2 (Two) Times a Day., Disp: , Rfl:     potassium chloride (KLOR-CON) 20 MEQ packet, Take 20 mEq by mouth., Disp: , Rfl:     Vitamin B Complex-C capsule, , Disp: , Rfl:     Allergies   Allergen Reactions    Prednisone Other (See Comments), Palpitations and Shortness Of Breath     Anxiety, Dizziness or Vertigo, Hallucinations, Other (See Comments), Palpitations, Shortness Of Breath       Family History   Problem Relation Age of Onset    Skin cancer Father     Skin cancer Brother     Heart attack Paternal Grandfather     Breast cancer Maternal Grandmother 40       Social History     Socioeconomic History    Marital status:    Tobacco Use    Smoking status: Every Day     Current packs/day: 0.50     Types: Cigarettes   Vaping Use    Vaping status: Never Used   Substance and Sexual Activity    Alcohol use: Yes     Comment: 2-3x/wk    Drug use: Never    Sexual activity: Yes     Partners: Male     Birth control/protection: Vasectomy       OB History    Para Term  AB Living   2 2 2 0 0 2   SAB IAB Ectopic Molar Multiple Live Births   0 0 0 0 0 2      # Outcome Date GA Lbr Ke/2nd Weight Sex Type Anes PTL Lv   2 Term / 40w0d  4252 g (9 lb 6 oz) F Vag-Spont   JULIANNA   1 Term 04 40w0d  3657 g (8 lb 1 oz) M Vag-Spont   JULIANNA       Review of Systems   Genitourinary:  Positive for menstrual problem, pelvic pain and vaginal bleeding.   All other systems reviewed and are  negative.         Objective   Vitals:    06/04/25 0913   BP: 148/92       Physical Exam  Constitutional:       General: She is not in acute distress.     Appearance: Normal appearance. She is not toxic-appearing.   HENT:      Head: Normocephalic and atraumatic.      Nose: Nose normal.   Neurological:      General: No focal deficit present.      Mental Status: She is alert.   Psychiatric:         Mood and Affect: Mood normal.         Behavior: Behavior normal.         Thought Content: Thought content normal.         Judgment: Judgment normal.            Assessment & Plan   Assessment/Plan:  Diagnoses and all orders for this visit:    1. Abnormal uterine bleeding (AUB) (Primary)  -     Case Request  -     sodium chloride 0.9 % flush 10 mL  -     sodium chloride 0.9 % flush 1-10 mL  -     sodium chloride 0.9 % infusion 40 mL    2. Menorrhagia with regular cycle  -     Case Request  -     sodium chloride 0.9 % flush 10 mL  -     sodium chloride 0.9 % flush 1-10 mL  -     sodium chloride 0.9 % infusion 40 mL    3. Dysmenorrhea  -     Case Request  -     sodium chloride 0.9 % flush 10 mL  -     sodium chloride 0.9 % flush 1-10 mL  -     sodium chloride 0.9 % infusion 40 mL    4. Endometrial thickening on ultrasound  -     Case Request  -     sodium chloride 0.9 % flush 10 mL  -     sodium chloride 0.9 % flush 1-10 mL  -     sodium chloride 0.9 % infusion 40 mL    5. Smoker    Other orders  -     Follow Anesthesia Guidelines / Protocol; Future  -     Follow Anesthesia Guidelines / Protocol; Standing  -     Verify / Perform Chlorhexidine Skin Prep; Standing  -     Provide NPO Instructions to Patient; Future  -     Chlorhexidine Skin Prep; Future  -     Instructions on coughing, deep breathing, and incentive spirometry.; Standing  -     Insert Peripheral IV; Standing  -     Saline Lock & Maintain IV Access; Standing  -     Place Sequential Compression Device; Standing  -     Maintain Sequential Compression Device;  Standing        The risks, benefits, and alternatives of the procedure; along with the risks of anesthesia was discussed in full with the patient and all questions were answered.    Sadiq Padgett MD            Family

## 2025-06-04 NOTE — H&P
Sadiq Padgett MD  [facility]  [address]  [place] [address]  Office [phone]  Fax [phone]      [facility]  Ne Ma  1980  [id number]  [id number]  2025    Alexander Ma is a 44 y.o. year old female  who presents for surgery due to DUB and Thickened endometrial lining.  Failed conservative measures include N/A.    COEMIG Procedure no  Rating of Pain 3  Effect on daily activities significant    HPI    Risks, benefits, and alternatives of a D&C with hysteroscopy were discussed with the patient in detail. Intraoperative risks of bleeding and damage to surrounding organs, including but not limited to intestine, bladder and ureter, were explained. Management of these were also explained. Postoperative complications such as infection, pneumonia, DVT, and bleeding were explained. The importance of compliance with postoperative restrictions was discussed. The diagnostic nature of the procedure was explained.    Uterine perforation was discussed with the patient at length.     All of the patient's questions were answered to her satisfaction. She was encouraged to return for an additional appointment if she had further questions. She verbalized understanding of the above and wished to proceed with the outlined plan.      Past Medical History:   Diagnosis Date    Anxiety and depression     Chronic GERD     Eating disorder     Fatty liver        Past Surgical History:   Procedure Laterality Date    ENDOSCOPY      LYMPH NODE DISSECTION Left 2006    Armpit    RHINOPLASTY      age 15         Current Outpatient Medications:     busPIRone (BUSPAR) 10 MG tablet, Take 1 tablet by mouth As Needed., Disp: , Rfl:     Cholecalciferol 50 MCG ( UT) capsule, Take 1 capsule by mouth., Disp: , Rfl:     Coenzyme Q10 200 MG capsule, Take  by mouth Daily., Disp: , Rfl:     cyanocobalamin (VITAMIN B-12) 500 MCG tablet, Take 1 tablet by mouth Daily.,  Disp: , Rfl:     FLUoxetine (PROzac) 40 MG capsule, Take 1 capsule by mouth Daily., Disp: , Rfl:     ibuprofen (ADVIL,MOTRIN) 200 MG tablet, Take 1 tablet by mouth Every 6 (Six) Hours As Needed. for pain, Disp: , Rfl:     lisdexamfetamine (VYVANSE) 40 MG capsule, Take 1 capsule by mouth Every Morning, Disp: , Rfl:     Magnesium 200 MG tablet, Take 1 tablet by mouth 2 (Two) Times a Day., Disp: , Rfl:     Midazolam, Anticonvulsant, (NAYZILAM NA), As Needed., Disp: , Rfl:     pantoprazole (PROTONIX) 20 MG EC tablet, Take 1 tablet by mouth 2 (Two) Times a Day., Disp: , Rfl:     potassium chloride (KLOR-CON) 20 MEQ packet, Take 20 mEq by mouth., Disp: , Rfl:     Vitamin B Complex-C capsule, , Disp: , Rfl:     Allergies   Allergen Reactions    Prednisone Other (See Comments), Palpitations and Shortness Of Breath     Anxiety, Dizziness or Vertigo, Hallucinations, Other (See Comments), Palpitations, Shortness Of Breath       Family History   Problem Relation Age of Onset    Skin cancer Father     Skin cancer Brother     Heart attack Paternal Grandfather     Breast cancer Maternal Grandmother 40       Social History     Socioeconomic History    Marital status:    Tobacco Use    Smoking status: Every Day     Current packs/day: 0.50     Types: Cigarettes   Vaping Use    Vaping status: Never Used   Substance and Sexual Activity    Alcohol use: Yes     Comment: 2-3x/wk    Drug use: Never    Sexual activity: Yes     Partners: Male     Birth control/protection: Vasectomy       OB History    Para Term  AB Living   2 2 2 0 0 2   SAB IAB Ectopic Molar Multiple Live Births   0 0 0 0 0 2      # Outcome Date GA Lbr Ke/2nd Weight Sex Type Anes PTL Lv   2 Term / 40w0d  4252 g (9 lb 6 oz) F Vag-Spont   JULIANNA   1 Term 04 40w0d  3657 g (8 lb 1 oz) M Vag-Spont   JULIANNA       Review of Systems   Genitourinary:  Positive for menstrual problem, pelvic pain and vaginal bleeding.   All other systems reviewed and are  negative.         Objective   Vitals:    06/04/25 0913   BP: 148/92       Physical Exam  Constitutional:       General: She is not in acute distress.     Appearance: Normal appearance. She is not toxic-appearing.   HENT:      Head: Normocephalic and atraumatic.      Nose: Nose normal.   Neurological:      General: No focal deficit present.      Mental Status: She is alert.   Psychiatric:         Mood and Affect: Mood normal.         Behavior: Behavior normal.         Thought Content: Thought content normal.         Judgment: Judgment normal.            Assessment & Plan   Assessment/Plan:  Diagnoses and all orders for this visit:    1. Abnormal uterine bleeding (AUB) (Primary)  -     Case Request  -     sodium chloride 0.9 % flush 10 mL  -     sodium chloride 0.9 % flush 1-10 mL  -     sodium chloride 0.9 % infusion 40 mL    2. Menorrhagia with regular cycle  -     Case Request  -     sodium chloride 0.9 % flush 10 mL  -     sodium chloride 0.9 % flush 1-10 mL  -     sodium chloride 0.9 % infusion 40 mL    3. Dysmenorrhea  -     Case Request  -     sodium chloride 0.9 % flush 10 mL  -     sodium chloride 0.9 % flush 1-10 mL  -     sodium chloride 0.9 % infusion 40 mL    4. Endometrial thickening on ultrasound  -     Case Request  -     sodium chloride 0.9 % flush 10 mL  -     sodium chloride 0.9 % flush 1-10 mL  -     sodium chloride 0.9 % infusion 40 mL    5. Smoker    Other orders  -     Follow Anesthesia Guidelines / Protocol; Future  -     Follow Anesthesia Guidelines / Protocol; Standing  -     Verify / Perform Chlorhexidine Skin Prep; Standing  -     Provide NPO Instructions to Patient; Future  -     Chlorhexidine Skin Prep; Future  -     Instructions on coughing, deep breathing, and incentive spirometry.; Standing  -     Insert Peripheral IV; Standing  -     Saline Lock & Maintain IV Access; Standing  -     Place Sequential Compression Device; Standing  -     Maintain Sequential Compression Device;  Standing        The risks, benefits, and alternatives of the procedure; along with the risks of anesthesia was discussed in full with the patient and all questions were answered.    Sadiq Padgett MD

## 2025-06-04 NOTE — PROGRESS NOTES
Chief Complaint  Menstrual Problem (Pt here as new gyn referral from [facility] for AUB, she had u/s done in office today, she c/o two periods a month that are heavy and has worsened in the last 3mo, she reports them as painful when they are heavy, no previous birth control use, her spouse is s/p vasectomy, [facility] and [facility], [facility])    Subjective        Ne Ma presents to [facility]  History of Present Illness    History of Present Illness  The patient is a 44-year-old female who presents today to discuss abnormal bleeding.    She has been experiencing menorrhagia for an extended period, with the past 3 months characterized by a menstrual cycle occurring every 14 days. The severity of her periods fluctuates, with some instances involving the passage of golf ball-sized clots and the need to change the largest available tampon within an hour. These intense episodes typically last for 3 days, followed by a period of 3 to 4 days without bleeding. Initially, she experienced spotting after 14 days, but this has since progressed to a full menstrual cycle, albeit less severe than the initial one.     She reports no recent changes in medication or supplement intake. She is sexually active and experiences bleeding after intercourse. She has undergone physical therapy for pelvic floor issues related to bladder problems, which have since improved. She has also noticed an increase in abdominal soreness, even outside of her menstrual cycle, and describes a peculiar sensation upon palpation. She smokes half a pack of cigarettes a day and is not currently on any anticoagulant therapy or GLP-1 medications. Her last ultrasound, conducted 2 years ago, revealed the presence of a cyst on her right ovary.    GYNECOLOGICAL HISTORY:  - Cycle length: 14 days  - Duration: 3-4 days  - Frequency: Every 14 days  - Flow:  "Heavy with clots    OBSTETRICAL HISTORY:  Obstetrics History discussed  : N/A, Para:     SOCIAL HISTORY  The patient smokes half a pack a day.    Objective   Vital Signs:  /92 (BP Location: Left arm, Patient Position: Sitting, Cuff Size: Adult)   Ht 172.7 cm (68\")   Wt 88.8 kg (195 lb 11.2 oz)   BMI 29.76 kg/m²   Estimated body mass index is 29.76 kg/m² as calculated from the following:    Height as of this encounter: 172.7 cm (68\").    Weight as of this encounter: 88.8 kg (195 lb 11.2 oz).          Physical Exam  Constitutional:       General: She is not in acute distress.     Appearance: Normal appearance. She is not toxic-appearing.   HENT:      Head: Normocephalic and atraumatic.      Nose: Nose normal.   Neurological:      General: No focal deficit present.      Mental Status: She is alert.   Psychiatric:         Mood and Affect: Mood normal.         Behavior: Behavior normal.         Thought Content: Thought content normal.         Judgment: Judgment normal.        Result Review :                Assessment and Plan   Diagnoses and all orders for this visit:    1. Abnormal uterine bleeding (AUB) (Primary)  -     Case Request  -     sodium chloride 0.9 % flush 10 mL  -     sodium chloride 0.9 % flush 1-10 mL  -     sodium chloride 0.9 % infusion 40 mL    2. Menorrhagia with regular cycle  -     Case Request  -     sodium chloride 0.9 % flush 10 mL  -     sodium chloride 0.9 % flush 1-10 mL  -     sodium chloride 0.9 % infusion 40 mL    3. Dysmenorrhea  -     Case Request  -     sodium chloride 0.9 % flush 10 mL  -     sodium chloride 0.9 % flush 1-10 mL  -     sodium chloride 0.9 % infusion 40 mL    4. Endometrial thickening on ultrasound  -     Case Request  -     sodium chloride 0.9 % flush 10 mL  -     sodium chloride 0.9 % flush 1-10 mL  -     sodium chloride 0.9 % infusion 40 mL    5. Smoker    Other orders  -     Follow Anesthesia Guidelines / Protocol; Future  -     Follow Anesthesia " Guidelines / Protocol; Standing  -     Verify / Perform Chlorhexidine Skin Prep; Standing  -     Provide NPO Instructions to Patient; Future  -     Chlorhexidine Skin Prep; Future  -     Instructions on coughing, deep breathing, and incentive spirometry.; Standing  -     Insert Peripheral IV; Standing  -     Saline Lock & Maintain IV Access; Standing  -     Place Sequential Compression Device; Standing  -     Maintain Sequential Compression Device; Standing        Assessment & Plan  Abnormal uterine bleeding    - Hysteroscopy and dilation and curettage (D & C) to evaluate the shadow observed in the cervix and perform biopsies  - Procedure scheduled for 06/27/2025  - Removal of any identified polyps to potentially alleviate frequent and heavy bleeding  - Pathology evaluation of tissue if no polyp is found  - Consider hormonal therapy if heavy and painful periods persist post-procedure    Follow-up    - Procedure scheduled for 06/27/2025         Follow Up   No follow-ups on file.  Patient was given instructions and counseling regarding her condition or for health maintenance advice. Please see specific information pulled into the AVS if appropriate.         Sadiq Padgett MD    Patient or patient representative verbalized consent for the use of Ambient Listening during the visit with  Sadiq Padgett MD for chart documentation. 6/4/2025  10:07 CDT

## 2025-06-12 ENCOUNTER — PRE-ADMISSION TESTING (OUTPATIENT)
Dept: PREADMISSION TESTING | Facility: HOSPITAL | Age: 45
End: 2025-06-12
Payer: OTHER GOVERNMENT

## 2025-06-12 LAB
ANION GAP SERPL CALCULATED.3IONS-SCNC: 11 MMOL/L (ref 5–15)
BUN SERPL-MCNC: 7.4 MG/DL (ref 6–20)
BUN/CREAT SERPL: 15.4 (ref 7–25)
CALCIUM SPEC-SCNC: 9 MG/DL (ref 8.6–10.5)
CHLORIDE SERPL-SCNC: 103 MMOL/L (ref 98–107)
CO2 SERPL-SCNC: 22 MMOL/L (ref 22–29)
CREAT SERPL-MCNC: 0.48 MG/DL (ref 0.57–1)
DEPRECATED RDW RBC AUTO: 40.7 FL (ref 37–54)
EGFRCR SERPLBLD CKD-EPI 2021: 120.7 ML/MIN/1.73
ERYTHROCYTE [DISTWIDTH] IN BLOOD BY AUTOMATED COUNT: 14.6 % (ref 12.3–15.4)
GLUCOSE SERPL-MCNC: 94 MG/DL (ref 65–99)
HCT VFR BLD AUTO: 32.7 % (ref 34–46.6)
HGB BLD-MCNC: 10.4 G/DL (ref 12–15.9)
MCH RBC QN AUTO: 24.8 PG (ref 26.6–33)
MCHC RBC AUTO-ENTMCNC: 31.8 G/DL (ref 31.5–35.7)
MCV RBC AUTO: 78 FL (ref 79–97)
PLATELET # BLD AUTO: 262 10*3/MM3 (ref 140–450)
PMV BLD AUTO: 9.8 FL (ref 6–12)
POTASSIUM SERPL-SCNC: 4 MMOL/L (ref 3.5–5.2)
RBC # BLD AUTO: 4.19 10*6/MM3 (ref 3.77–5.28)
SODIUM SERPL-SCNC: 136 MMOL/L (ref 136–145)
WBC NRBC COR # BLD AUTO: 4.71 10*3/MM3 (ref 3.4–10.8)

## 2025-06-12 PROCEDURE — 85027 COMPLETE CBC AUTOMATED: CPT

## 2025-06-12 PROCEDURE — 36415 COLL VENOUS BLD VENIPUNCTURE: CPT

## 2025-06-12 PROCEDURE — 80048 BASIC METABOLIC PNL TOTAL CA: CPT

## 2025-06-12 RX ORDER — MIRTAZAPINE 15 MG/1
7.5 TABLET, ORALLY DISINTEGRATING ORAL DAILY PRN
COMMUNITY

## 2025-06-12 NOTE — DISCHARGE INSTRUCTIONS

## 2025-06-27 ENCOUNTER — ANESTHESIA (OUTPATIENT)
Dept: PERIOP | Facility: HOSPITAL | Age: 45
End: 2025-06-27
Payer: OTHER GOVERNMENT

## 2025-06-27 ENCOUNTER — HOSPITAL ENCOUNTER (OUTPATIENT)
Facility: HOSPITAL | Age: 45
Setting detail: HOSPITAL OUTPATIENT SURGERY
Discharge: HOME | End: 2025-06-27
Attending: OBSTETRICS & GYNECOLOGY | Admitting: OBSTETRICS & GYNECOLOGY
Payer: OTHER GOVERNMENT

## 2025-06-27 ENCOUNTER — ANESTHESIA EVENT (OUTPATIENT)
Dept: PERIOP | Facility: HOSPITAL | Age: 45
End: 2025-06-27
Payer: OTHER GOVERNMENT

## 2025-06-27 VITALS
DIASTOLIC BLOOD PRESSURE: 77 MMHG | OXYGEN SATURATION: 98 % | RESPIRATION RATE: 16 BRPM | TEMPERATURE: 97.5 F | SYSTOLIC BLOOD PRESSURE: 135 MMHG | HEART RATE: 63 BPM

## 2025-06-27 DIAGNOSIS — N94.6 DYSMENORRHEA: ICD-10-CM

## 2025-06-27 DIAGNOSIS — N92.0 MENORRHAGIA WITH REGULAR CYCLE: ICD-10-CM

## 2025-06-27 DIAGNOSIS — N93.9 ABNORMAL UTERINE BLEEDING (AUB): ICD-10-CM

## 2025-06-27 DIAGNOSIS — R93.89 ENDOMETRIAL THICKENING ON ULTRASOUND: ICD-10-CM

## 2025-06-27 LAB — B-HCG UR QL: NEGATIVE

## 2025-06-27 PROCEDURE — 88305 TISSUE EXAM BY PATHOLOGIST: CPT | Performed by: OBSTETRICS & GYNECOLOGY

## 2025-06-27 PROCEDURE — 25010000002 KETOROLAC TROMETHAMINE PER 15 MG: Performed by: NURSE ANESTHETIST, CERTIFIED REGISTERED

## 2025-06-27 PROCEDURE — 25810000003 LACTATED RINGERS PER 1000 ML: Performed by: OBSTETRICS & GYNECOLOGY

## 2025-06-27 PROCEDURE — C1782 MORCELLATOR: HCPCS | Performed by: OBSTETRICS & GYNECOLOGY

## 2025-06-27 PROCEDURE — 25010000002 FENTANYL CITRATE (PF) 100 MCG/2ML SOLUTION: Performed by: NURSE ANESTHETIST, CERTIFIED REGISTERED

## 2025-06-27 PROCEDURE — 25010000002 PROPOFOL 10 MG/ML EMULSION: Performed by: NURSE ANESTHETIST, CERTIFIED REGISTERED

## 2025-06-27 PROCEDURE — 25010000002 ONDANSETRON PER 1 MG: Performed by: NURSE ANESTHETIST, CERTIFIED REGISTERED

## 2025-06-27 PROCEDURE — 58558 HYSTEROSCOPY BIOPSY: CPT | Performed by: OBSTETRICS & GYNECOLOGY

## 2025-06-27 PROCEDURE — 25010000002 LIDOCAINE PF 2% 2 % SOLUTION: Performed by: NURSE ANESTHETIST, CERTIFIED REGISTERED

## 2025-06-27 PROCEDURE — 25010000002 MIDAZOLAM PER 1MG: Performed by: ANESTHESIOLOGY

## 2025-06-27 PROCEDURE — 81025 URINE PREGNANCY TEST: CPT | Performed by: OBSTETRICS & GYNECOLOGY

## 2025-06-27 RX ORDER — DEXAMETHASONE SODIUM PHOSPHATE 4 MG/ML
4 INJECTION, SOLUTION INTRA-ARTICULAR; INTRALESIONAL; INTRAMUSCULAR; INTRAVENOUS; SOFT TISSUE ONCE AS NEEDED
Status: DISCONTINUED | OUTPATIENT
Start: 2025-06-27 | End: 2025-06-27 | Stop reason: HOSPADM

## 2025-06-27 RX ORDER — FENTANYL CITRATE 50 UG/ML
25 INJECTION, SOLUTION INTRAMUSCULAR; INTRAVENOUS
Status: DISCONTINUED | OUTPATIENT
Start: 2025-06-27 | End: 2025-06-27 | Stop reason: HOSPADM

## 2025-06-27 RX ORDER — KETOROLAC TROMETHAMINE 30 MG/ML
INJECTION, SOLUTION INTRAMUSCULAR; INTRAVENOUS AS NEEDED
Status: DISCONTINUED | OUTPATIENT
Start: 2025-06-27 | End: 2025-06-27 | Stop reason: SURG

## 2025-06-27 RX ORDER — SODIUM CHLORIDE 0.9 % (FLUSH) 0.9 %
10 SYRINGE (ML) INJECTION EVERY 12 HOURS SCHEDULED
Status: DISCONTINUED | OUTPATIENT
Start: 2025-06-27 | End: 2025-06-27 | Stop reason: HOSPADM

## 2025-06-27 RX ORDER — MIDAZOLAM HYDROCHLORIDE 2 MG/2ML
2 INJECTION, SOLUTION INTRAMUSCULAR; INTRAVENOUS ONCE
Status: COMPLETED | OUTPATIENT
Start: 2025-06-27 | End: 2025-06-27

## 2025-06-27 RX ORDER — SODIUM CHLORIDE 9 MG/ML
40 INJECTION, SOLUTION INTRAVENOUS AS NEEDED
Status: DISCONTINUED | OUTPATIENT
Start: 2025-06-27 | End: 2025-06-27 | Stop reason: HOSPADM

## 2025-06-27 RX ORDER — IBUPROFEN 600 MG/1
600 TABLET, FILM COATED ORAL EVERY 6 HOURS PRN
Status: DISCONTINUED | OUTPATIENT
Start: 2025-06-27 | End: 2025-06-27 | Stop reason: HOSPADM

## 2025-06-27 RX ORDER — HYDROCODONE BITARTRATE AND ACETAMINOPHEN 5; 325 MG/1; MG/1
1 TABLET ORAL EVERY 4 HOURS PRN
Status: DISCONTINUED | OUTPATIENT
Start: 2025-06-27 | End: 2025-06-27 | Stop reason: HOSPADM

## 2025-06-27 RX ORDER — HYDROCODONE BITARTRATE AND ACETAMINOPHEN 10; 325 MG/1; MG/1
1 TABLET ORAL EVERY 4 HOURS PRN
Status: DISCONTINUED | OUTPATIENT
Start: 2025-06-27 | End: 2025-06-27 | Stop reason: HOSPADM

## 2025-06-27 RX ORDER — FENTANYL CITRATE 50 UG/ML
50 INJECTION, SOLUTION INTRAMUSCULAR; INTRAVENOUS
Status: DISCONTINUED | OUTPATIENT
Start: 2025-06-27 | End: 2025-06-27 | Stop reason: HOSPADM

## 2025-06-27 RX ORDER — ONDANSETRON 2 MG/ML
4 INJECTION INTRAMUSCULAR; INTRAVENOUS ONCE AS NEEDED
Status: DISCONTINUED | OUTPATIENT
Start: 2025-06-27 | End: 2025-06-27 | Stop reason: HOSPADM

## 2025-06-27 RX ORDER — FLUMAZENIL 0.1 MG/ML
0.2 INJECTION INTRAVENOUS AS NEEDED
Status: DISCONTINUED | OUTPATIENT
Start: 2025-06-27 | End: 2025-06-27 | Stop reason: HOSPADM

## 2025-06-27 RX ORDER — FENTANYL CITRATE 50 UG/ML
INJECTION, SOLUTION INTRAMUSCULAR; INTRAVENOUS AS NEEDED
Status: DISCONTINUED | OUTPATIENT
Start: 2025-06-27 | End: 2025-06-27 | Stop reason: SURG

## 2025-06-27 RX ORDER — PROPOFOL 10 MG/ML
VIAL (ML) INTRAVENOUS AS NEEDED
Status: DISCONTINUED | OUTPATIENT
Start: 2025-06-27 | End: 2025-06-27 | Stop reason: SURG

## 2025-06-27 RX ORDER — NALOXONE HCL 0.4 MG/ML
0.4 VIAL (ML) INJECTION AS NEEDED
Status: DISCONTINUED | OUTPATIENT
Start: 2025-06-27 | End: 2025-06-27 | Stop reason: HOSPADM

## 2025-06-27 RX ORDER — SODIUM CHLORIDE 0.9 % (FLUSH) 0.9 %
1-10 SYRINGE (ML) INJECTION AS NEEDED
Status: DISCONTINUED | OUTPATIENT
Start: 2025-06-27 | End: 2025-06-27 | Stop reason: HOSPADM

## 2025-06-27 RX ORDER — MAGNESIUM HYDROXIDE 1200 MG/15ML
LIQUID ORAL AS NEEDED
Status: DISCONTINUED | OUTPATIENT
Start: 2025-06-27 | End: 2025-06-27 | Stop reason: HOSPADM

## 2025-06-27 RX ORDER — SCOPOLAMINE 1 MG/3D
1 PATCH, EXTENDED RELEASE TRANSDERMAL ONCE
Status: DISCONTINUED | OUTPATIENT
Start: 2025-06-27 | End: 2025-06-27 | Stop reason: HOSPADM

## 2025-06-27 RX ORDER — LABETALOL HYDROCHLORIDE 5 MG/ML
5 INJECTION, SOLUTION INTRAVENOUS
Status: DISCONTINUED | OUTPATIENT
Start: 2025-06-27 | End: 2025-06-27 | Stop reason: HOSPADM

## 2025-06-27 RX ORDER — LIDOCAINE HYDROCHLORIDE 20 MG/ML
INJECTION, SOLUTION EPIDURAL; INFILTRATION; INTRACAUDAL; PERINEURAL AS NEEDED
Status: DISCONTINUED | OUTPATIENT
Start: 2025-06-27 | End: 2025-06-27 | Stop reason: SURG

## 2025-06-27 RX ORDER — DEXTROSE MONOHYDRATE 25 G/50ML
12.5 INJECTION, SOLUTION INTRAVENOUS AS NEEDED
Status: DISCONTINUED | OUTPATIENT
Start: 2025-06-27 | End: 2025-06-27 | Stop reason: HOSPADM

## 2025-06-27 RX ORDER — PROMETHAZINE HYDROCHLORIDE 25 MG/1
12.5 TABLET ORAL ONCE AS NEEDED
Status: DISCONTINUED | OUTPATIENT
Start: 2025-06-27 | End: 2025-06-27 | Stop reason: HOSPADM

## 2025-06-27 RX ORDER — MIDAZOLAM HYDROCHLORIDE 2 MG/2ML
1 INJECTION, SOLUTION INTRAMUSCULAR; INTRAVENOUS
Status: DISCONTINUED | OUTPATIENT
Start: 2025-06-27 | End: 2025-06-27 | Stop reason: HOSPADM

## 2025-06-27 RX ORDER — ONDANSETRON 2 MG/ML
INJECTION INTRAMUSCULAR; INTRAVENOUS AS NEEDED
Status: DISCONTINUED | OUTPATIENT
Start: 2025-06-27 | End: 2025-06-27 | Stop reason: SURG

## 2025-06-27 RX ORDER — SODIUM CHLORIDE 0.9 % (FLUSH) 0.9 %
10 SYRINGE (ML) INJECTION AS NEEDED
Status: DISCONTINUED | OUTPATIENT
Start: 2025-06-27 | End: 2025-06-27 | Stop reason: HOSPADM

## 2025-06-27 RX ORDER — SODIUM CHLORIDE, SODIUM LACTATE, POTASSIUM CHLORIDE, CALCIUM CHLORIDE 600; 310; 30; 20 MG/100ML; MG/100ML; MG/100ML; MG/100ML
30 INJECTION, SOLUTION INTRAVENOUS CONTINUOUS
Status: DISCONTINUED | OUTPATIENT
Start: 2025-06-27 | End: 2025-06-27 | Stop reason: HOSPADM

## 2025-06-27 RX ADMIN — PROPOFOL 200 MG: 10 INJECTION, EMULSION INTRAVENOUS at 13:16

## 2025-06-27 RX ADMIN — MIDAZOLAM HYDROCHLORIDE 2 MG: 1 INJECTION, SOLUTION INTRAMUSCULAR; INTRAVENOUS at 12:32

## 2025-06-27 RX ADMIN — SODIUM CHLORIDE, POTASSIUM CHLORIDE, SODIUM LACTATE AND CALCIUM CHLORIDE 30 ML/HR: 600; 310; 30; 20 INJECTION, SOLUTION INTRAVENOUS at 10:18

## 2025-06-27 RX ADMIN — LIDOCAINE HYDROCHLORIDE 100 MG: 20 INJECTION, SOLUTION EPIDURAL; INFILTRATION; INTRACAUDAL; PERINEURAL at 13:15

## 2025-06-27 RX ADMIN — ONDANSETRON 4 MG: 2 INJECTION INTRAMUSCULAR; INTRAVENOUS at 13:30

## 2025-06-27 RX ADMIN — FENTANYL CITRATE 100 MCG: 50 INJECTION, SOLUTION INTRAMUSCULAR; INTRAVENOUS at 13:14

## 2025-06-27 RX ADMIN — KETOROLAC TROMETHAMINE 30 MG: 30 INJECTION, SOLUTION INTRAMUSCULAR; INTRAVENOUS at 13:39

## 2025-06-27 NOTE — ANESTHESIA PREPROCEDURE EVALUATION
Anesthesia Evaluation     Patient summary reviewed   history of anesthetic complications:  PONV  NPO Solid Status: > 8 hours             Airway   Mallampati: II  Dental      Pulmonary    (+) a smoker,  (-) COPD, asthma, sleep apnea  Cardiovascular   Exercise tolerance: excellent (>7 METS)    (-) pacemaker, past MI, angina, cardiac stents      Neuro/Psych  (-) seizures, TIA, CVA  GI/Hepatic/Renal/Endo    (+) GERD  (-) liver disease, no renal disease, diabetes    Musculoskeletal     Abdominal    Substance History      OB/GYN    (-)  Pregnant        Other                    Anesthesia Plan    ASA 2     general     intravenous induction     Anesthetic plan, risks, benefits, and alternatives have been provided, discussed and informed consent has been obtained with: patient.    CODE STATUS:

## 2025-06-27 NOTE — ANESTHESIA POSTPROCEDURE EVALUATION
Patient: Ne Ma    Procedure Summary       Date: 06/27/25 Room / Location: [facility] OR [address] [place] [address] PAD OR    Anesthesia Start: 1313 Anesthesia Stop: 1350    Procedure: DILATATION AND CURETTAGE HYSTEROSCOPY WITH POSSIBLE TISSUE RESECTION (Vagina) Diagnosis:       Abnormal uterine bleeding (AUB)      Menorrhagia with regular cycle      Dysmenorrhea      Endometrial thickening on ultrasound      (Abnormal uterine bleeding (AUB) [N93.9])      (Menorrhagia with regular cycle [N92.0])      (Dysmenorrhea [N94.6])      (Endometrial thickening on ultrasound [R93.89])    Surgeons: Sadiq Padgett MD Provider: Lydia Morgan CRNA    Anesthesia Type: general ASA Status: 2            Anesthesia Type: general    Vitals  Vitals Value Taken Time   /86 06/27/25 14:15   Temp 97.5 °F (36.4 °C) 06/27/25 14:15   Pulse 72 06/27/25 14:16   Resp 12 06/27/25 14:15   SpO2 94 % 06/27/25 14:16   Vitals shown include unfiled device data.        Post Anesthesia Care and Evaluation    Patient location during evaluation: PACU  Patient participation: complete - patient participated  Level of consciousness: awake and alert  Pain management: adequate    Airway patency: patent  Anesthetic complications: No anesthetic complications    Cardiovascular status: acceptable  Respiratory status: acceptable  Hydration status: acceptable    Comments: Blood pressure 135/77, pulse 63, temperature 97.5 °F (36.4 °C), temperature source Temporal, resp. rate 16, last menstrual period 05/31/2025, SpO2 98%, not currently breastfeeding.    Pt discharged from PACU based on silverio score >8

## 2025-06-27 NOTE — ANESTHESIA PROCEDURE NOTES
Airway  Reason: elective    Date/Time: 6/27/2025 1:18 PM    General Information and Staff    Patient location during procedure: OR  CRNA/CAA: Rashid Parish CRNA    Indications and Patient Condition  Indications for airway management: airway protection    Preoxygenated: yes  MILS maintained throughout    Mask difficulty assessment: 0 - not attempted    Final Airway Details    Final airway type: supraglottic airway      Successful airway: I-gel  Size: 3   Number of attempts at approach: 1  Assessment: lips, teeth, and gum same as pre-op and atraumatic intubation

## 2025-06-27 NOTE — OP NOTE
Sadiq Padgett MD  [facility]  [address]  [place] [id number]  Office [phone]  Fax [phone]      [facility]  Ne Ma  1980  [id number]  [id number]  6/27/2025      Pre-operative Diagnosis: DUB and Thickened endometrial lining    Post-operative Diagnosis: DUB and Thickened endometrial lining    Operation: Hysteroscopic Polypectomy, Endocervical Currettage, and Endometrial Currettage    Surgeon: KWASI Padgett MD, FACOG    Assistants:     Anesthesia: General endotracheal anesthesia    Findings: Moderate uterine descent, Thickened cavity, and polypoid material    Estimated Blood Loss: Minimal      Specimens: Endocervical currettings, Endometrial currettings, and Hysteroscopic resected edometrial tissue    Complications:  None    Disposition: PACU - hemodynamically stable.      Procedure Details      After consents were obtained the patient was taken to the operating room, where general anesthesia was administered. She was placed in dorsal lithotomy position, with care taken in placement of legs to avoid nerve injury. She is prepped and draped in the usual sterile fashion.    In-and-out catheter was placed into the bladder for decompression. The cervix was identified and grasped with a single-tooth tenaculum. Please see comments above regarding details of the exam.   Endocervical curettage was performed sharply and specimen sent separately. The cervix was then gradually and gently dilated to a #7 and the MyoSure hysteroscope was inserted under direct visualization. The above findings were noted.     MyoSure Lyte device was then used to resect the polypoid tissue and thickened lining. Hemostasis was noted. Fluid deficit was 150. The instrument and the hysteroscope were removed. Curettage was then performed with a sharp curette to a gritty texture. Hemostasis was noted from the os, as well as the single-tooth tenaculum site.     She tolerated the procedure well.  Instrument counts are correct. She was extubated, awakened, and taken to recovery room in stable condition.       Sadiq Padgett MD  06/27/25  13:42 CDT

## 2025-07-04 LAB
CYTO UR: NORMAL
LAB AP CASE REPORT: NORMAL
Lab: NORMAL
PATH REPORT.FINAL DX SPEC: NORMAL
PATH REPORT.GROSS SPEC: NORMAL

## 2025-08-06 ENCOUNTER — TELEMEDICINE (OUTPATIENT)
Age: 45
End: 2025-08-06
Payer: OTHER GOVERNMENT

## 2025-08-06 DIAGNOSIS — F17.200 SMOKER: ICD-10-CM

## 2025-08-06 DIAGNOSIS — Z09 POSTOP CHECK: Primary | ICD-10-CM

## 2025-08-14 ENCOUNTER — TELEPHONE (OUTPATIENT)
Dept: GASTROENTEROLOGY | Age: 45
End: 2025-08-14

## 2025-08-14 DIAGNOSIS — K76.0 FATTY LIVER: Primary | ICD-10-CM

## 2025-08-19 DIAGNOSIS — K76.0 FATTY LIVER: ICD-10-CM

## 2025-08-19 LAB
AFP SERPL-MCNC: <1.9 NG/ML (ref 0–8.3)
ALBUMIN SERPL-MCNC: 4.5 G/DL (ref 3.5–5.2)
ALP SERPL-CCNC: 63 U/L (ref 35–104)
ALT SERPL-CCNC: 18 U/L (ref 10–35)
ANION GAP SERPL CALCULATED.3IONS-SCNC: 11 MMOL/L (ref 8–16)
AST SERPL-CCNC: 17 U/L (ref 10–35)
BILIRUB DIRECT SERPL-MCNC: <0.1 MG/DL (ref 0–0.3)
BILIRUB INDIRECT SERPL-MCNC: 0.2 MG/DL (ref 0–1)
BILIRUB SERPL-MCNC: 0.3 MG/DL (ref 0.2–1.2)
BUN SERPL-MCNC: 9 MG/DL (ref 6–20)
CALCIUM SERPL-MCNC: 9.4 MG/DL (ref 8.6–10)
CHLORIDE SERPL-SCNC: 102 MMOL/L (ref 98–107)
CO2 SERPL-SCNC: 23 MMOL/L (ref 22–29)
CREAT SERPL-MCNC: 0.6 MG/DL (ref 0.5–0.9)
ERYTHROCYTE [DISTWIDTH] IN BLOOD BY AUTOMATED COUNT: 15.4 % (ref 11.5–14.5)
GLUCOSE SERPL-MCNC: 97 MG/DL (ref 70–99)
HCT VFR BLD AUTO: 34.3 % (ref 37–47)
HGB BLD-MCNC: 10.9 G/DL (ref 12–16)
INR PPP: 0.98 (ref 0.88–1.18)
MCH RBC QN AUTO: 25.1 PG (ref 27–31)
MCHC RBC AUTO-ENTMCNC: 31.8 G/DL (ref 33–37)
MCV RBC AUTO: 79 FL (ref 81–99)
PLATELET # BLD AUTO: 267 K/UL (ref 130–400)
PMV BLD AUTO: 10.2 FL (ref 9.4–12.3)
POTASSIUM SERPL-SCNC: 3.6 MMOL/L (ref 3.5–5.1)
PROT SERPL-MCNC: 7 G/DL (ref 6.4–8.3)
PROTHROMBIN TIME: 13 SEC (ref 12–14.6)
RBC # BLD AUTO: 4.34 M/UL (ref 4.2–5.4)
SODIUM SERPL-SCNC: 136 MMOL/L (ref 136–145)
WBC # BLD AUTO: 5.3 K/UL (ref 4.8–10.8)

## (undated) DEVICE — PACK,SET UP,NO DRAPES: Brand: MEDLINE

## (undated) DEVICE — TOWEL,OR,DSP,ST,BLUE,STD,4/PK,20PK/CS: Brand: MEDLINE

## (undated) DEVICE — PAD,SANITARY,11 IN,MAXI,N-STRL,IND WRAP: Brand: MEDLINE

## (undated) DEVICE — VAGINAL PREP TRAY: Brand: MEDLINE INDUSTRIES, INC.

## (undated) DEVICE — GAUZE,SPONGE,4"X4",16PLY,XRAY,STRL,LF: Brand: MEDLINE

## (undated) DEVICE — PAD,PREPPING,CUFFED,24X48,7",NONSTERILE: Brand: MEDLINE

## (undated) DEVICE — GOWN SURG ORBIS LVL3 X/LNG 2XL STRL

## (undated) DEVICE — KT PROC FLUENTPRO

## (undated) DEVICE — BAPTIST TURNOVER KIT: Brand: MEDLINE INDUSTRIES, INC.

## (undated) DEVICE — KT PROC HYSTSCPY TB ST

## (undated) DEVICE — GLV SURG SENSICARE W/ALOE PF LF 7.5 STRL

## (undated) DEVICE — PAD,NON-ADHERENT,3X8,STERILE,LF,1/PK: Brand: MEDLINE

## (undated) DEVICE — CVR HNDL LIGHT RIGID

## (undated) DEVICE — PCH SURG INVISISHIELD FLD/COL W/DRN/PRT 20X6IN

## (undated) DEVICE — SEAL HYSTERSCOPE/OUTFLOW CHANNEL MYOSURE

## (undated) DEVICE — Device